# Patient Record
Sex: FEMALE | Race: WHITE | NOT HISPANIC OR LATINO | Employment: OTHER | URBAN - METROPOLITAN AREA
[De-identification: names, ages, dates, MRNs, and addresses within clinical notes are randomized per-mention and may not be internally consistent; named-entity substitution may affect disease eponyms.]

---

## 2022-03-08 ENCOUNTER — OFFICE VISIT (OUTPATIENT)
Dept: PAIN MEDICINE | Facility: CLINIC | Age: 62
End: 2022-03-08

## 2022-03-08 VITALS
SYSTOLIC BLOOD PRESSURE: 150 MMHG | WEIGHT: 120 LBS | DIASTOLIC BLOOD PRESSURE: 84 MMHG | RESPIRATION RATE: 16 BRPM | HEART RATE: 92 BPM | BODY MASS INDEX: 19.29 KG/M2 | HEIGHT: 66 IN | OXYGEN SATURATION: 95 %

## 2022-03-08 DIAGNOSIS — M54.16 LUMBAR RADICULITIS: ICD-10-CM

## 2022-03-08 DIAGNOSIS — M47.817 LUMBOSACRAL SPONDYLOSIS WITHOUT MYELOPATHY: ICD-10-CM

## 2022-03-08 DIAGNOSIS — G89.4 CHRONIC PAIN SYNDROME: Primary | ICD-10-CM

## 2022-03-08 PROCEDURE — 99204 OFFICE O/P NEW MOD 45 MIN: CPT | Performed by: ANESTHESIOLOGY

## 2022-03-08 RX ORDER — ALBUTEROL SULFATE 90 UG/1
AEROSOL, METERED RESPIRATORY (INHALATION)
COMMUNITY
Start: 2022-03-01 | End: 2022-03-08 | Stop reason: ALTCHOICE

## 2022-03-08 RX ORDER — CARIPRAZINE 3 MG/1
CAPSULE, GELATIN COATED ORAL
COMMUNITY
Start: 2022-03-01 | End: 2022-03-08 | Stop reason: ALTCHOICE

## 2022-03-08 RX ORDER — ERGOCALCIFEROL 1.25 MG/1
CAPSULE ORAL
COMMUNITY
Start: 2022-01-12 | End: 2022-03-08 | Stop reason: ALTCHOICE

## 2022-03-08 RX ORDER — LISINOPRIL 40 MG/1
TABLET ORAL
COMMUNITY
Start: 2013-10-07

## 2022-03-08 RX ORDER — OMEPRAZOLE 40 MG/1
CAPSULE, DELAYED RELEASE ORAL
COMMUNITY
Start: 2022-03-01

## 2022-03-08 RX ORDER — ATOMOXETINE 60 MG/1
CAPSULE ORAL
COMMUNITY
Start: 2022-03-01

## 2022-03-08 RX ORDER — AMLODIPINE BESYLATE 5 MG/1
TABLET ORAL
COMMUNITY
Start: 2022-03-01

## 2022-03-08 RX ORDER — ESTRADIOL 1 MG/1
TABLET ORAL
COMMUNITY
Start: 2022-03-01

## 2022-03-08 RX ORDER — ROPINIROLE 1 MG/1
TABLET, FILM COATED ORAL
COMMUNITY
Start: 2022-03-01

## 2022-03-08 RX ORDER — FLUTICASONE PROPIONATE 50 MCG
SPRAY, SUSPENSION (ML) NASAL
COMMUNITY
Start: 2022-03-01

## 2022-03-08 RX ORDER — LISINOPRIL 40 MG/1
TABLET ORAL
COMMUNITY
Start: 2022-03-01 | End: 2022-03-08 | Stop reason: ALTCHOICE

## 2022-03-08 RX ORDER — FOLIC ACID 1 MG/1
TABLET ORAL EVERY 24 HOURS
COMMUNITY
Start: 2021-11-16

## 2022-03-08 RX ORDER — FERROUS SULFATE 325(65) MG
TABLET ORAL
COMMUNITY
Start: 2022-03-01

## 2022-03-08 RX ORDER — FERROUS SULFATE 325(65) MG
TABLET ORAL EVERY 12 HOURS SCHEDULED
COMMUNITY

## 2022-03-08 RX ORDER — ESCITALOPRAM OXALATE 20 MG/1
TABLET ORAL
COMMUNITY
Start: 2022-03-01

## 2022-03-08 RX ORDER — MONTELUKAST SODIUM 10 MG/1
TABLET ORAL
COMMUNITY
Start: 2013-10-07

## 2022-03-08 RX ORDER — MULTIPLE VITAMINS W/ MINERALS TAB 9MG-400MCG
TAB ORAL EVERY 24 HOURS
COMMUNITY
Start: 2021-11-16

## 2022-03-08 RX ORDER — CELECOXIB 200 MG/1
CAPSULE ORAL
COMMUNITY
Start: 2022-03-01

## 2022-03-08 RX ORDER — IPRATROPIUM/ALBUTEROL SULFATE 20-100 MCG
MIST INHALER (GRAM) INHALATION
COMMUNITY
Start: 2022-03-01

## 2022-03-08 RX ORDER — VORTIOXETINE 10 MG/1
TABLET, FILM COATED ORAL
COMMUNITY
Start: 2022-03-01 | End: 2022-03-08 | Stop reason: ALTCHOICE

## 2022-03-08 RX ORDER — CHOLECALCIFEROL (VITAMIN D3) 1250 MCG
CAPSULE ORAL
COMMUNITY
Start: 2021-12-13

## 2022-03-08 RX ORDER — VORTIOXETINE 10 MG/1
TABLET, FILM COATED ORAL EVERY 24 HOURS
COMMUNITY

## 2022-03-08 RX ORDER — ALBUTEROL SULFATE 90 UG/1
AEROSOL, METERED RESPIRATORY (INHALATION)
COMMUNITY

## 2022-03-08 RX ORDER — LAMOTRIGINE 25 MG/1
TABLET ORAL
COMMUNITY
Start: 2022-03-01

## 2022-03-08 NOTE — PROGRESS NOTES
Subjective    CC back pain, joint pain  Cindy Mccabe is a 61 y.o. female with chronic polyarthralgia and back pain here for initial evaluation.  Referred by PCP.  Complains of worsening lumbar back pain radiating to bilateral hips and bilateral lower extremity with burning tingling in the thigh and lower leg constant but worse with activity sweeping house chores walking or standing.  Denies weakness, saddle anesthesia, bladder bowel continence.  Pain significantly impairing ADL.  Tried home exercise program, anti-inflammatories, muscle relaxers without relief.    L-spine MRI 2/2022 mild degenerative disc disease and facet degenerative changes. Mild neuroforaminal narrowing on the left at L2-L3, L3-L4 and mild bilateral neuroforaminal narrowing L4-L5. Remote compression fracture of L1.    Pain Assessment   Location of Pain: Lower Back, R Hip, L Hip,legs, neck pain, joint  Description of Pain: Dull/Aching, Throbbing, Stabbing  Previous Pain Rating :10  Current Pain Rating: 10  Aggravating Factors: Activity  Alleviating Factors: Rest, Medication    PEG Assessment   What number best describes your pain on average in the past week?8  What number best describes how, during the past week, pain has interfered with your enjoyment of life?8  What number best describes how, during the past week, pain has interfered with your general activity? 10     The following portions of the patient's history were reviewed and updated as appropriate: allergies, current medications, past family history, past medical history, past social history, past surgical history and problem list.     has a past medical history of Anxiety, Arthritis, Depression, Hip pain, Hypertension, Knee pain, and Leg pain.   has a past surgical history that includes Hysterectomy.  family history is not on file.  Social History     Tobacco Use   • Smoking status: Current Every Day Smoker     Packs/day: 1.00     Types: Cigarettes   • Smokeless tobacco: Never Used  "  Substance Use Topics   • Alcohol use: Yes     Comment: occasional  one  a  month       Review of Systems   Musculoskeletal: Positive for arthralgias and back pain.        Bilateral leg pain   All other systems reviewed and are negative.      Objective   Physical Exam  Vitals reviewed.   Constitutional:       General: She is not in acute distress.  Pulmonary:      Effort: Pulmonary effort is normal.   Musculoskeletal:      Lumbar back: Tenderness present. Decreased range of motion. Positive right straight leg raise test and positive left straight leg raise test.      Comments: Lumbar loading positive, pain on extension of low back past 5 degrees.  TTP on the lumbar facets noted.         /84   Pulse 92   Resp 16   Ht 167.6 cm (66\")   Wt 54.4 kg (120 lb)   SpO2 95%   BMI 19.37 kg/m²      PHQ 9 on chart  Opioid risk tool low risk    Assessment/Plan   Diagnoses and all orders for this visit:    1. Chronic pain syndrome (Primary)    2. Lumbosacral spondylosis without myelopathy    3. Lumbar radiculitis  -     Epidural Block    Summary  Cindy Mccabe is a 61 y.o. female with chronic polyarthralgia and back pain here for initial evaluation.  Referred by PCP.  Complains of worsening lumbar back pain radiating to bilateral hips and bilateral lower extremity with burning tingling in the thigh and lower leg constant but worse with activity sweeping house chores walking or standing.  Denies weakness, saddle anesthesia, bladder bowel continence.  Pain significantly impairing ADL.  Tried home exercise program, anti-inflammatories, muscle relaxers without relief.    Spine MRI with multiple level degenerative changes foraminal narrowing worse on the right.  Old compression fracture at L1.  Schedule for LESI.  Risks and benefits discussed.  Consider facet blocks/RFA.    RTC for procedure      "

## 2022-03-09 ENCOUNTER — PATIENT ROUNDING (BHMG ONLY) (OUTPATIENT)
Dept: PAIN MEDICINE | Facility: CLINIC | Age: 62
End: 2022-03-09

## 2022-03-09 NOTE — PROGRESS NOTES
March 9, 2022    Hello, may I speak with Cindy Mccabe?    My name is Ana Lilia     I am  with [facility]  [address]  [place] [address].    Before we get started may I verify your date of birth? 1960    I am calling to officially welcome you to our practice and ask about your recent visit. Is this a good time to talk? yes    Tell me about your visit with us. What things went well?  It went well I understand what our plan is.       We're always looking for ways to make our patients' experiences even better. Do you have recommendations on ways we may improve?  no    Overall were you satisfied with your first visit to our practice? yes       I appreciate you taking the time to speak with me today. Is there anything else I can do for you? no      Thank you, and have a great day.

## 2022-05-13 ENCOUNTER — TELEPHONE (OUTPATIENT)
Dept: PAIN MEDICINE | Facility: CLINIC | Age: 62
End: 2022-05-13

## 2022-05-13 NOTE — TELEPHONE ENCOUNTER
Caller: Cindy Mccabe    Relationship to patient: Self    Best call back number: [phone]    Type of visit: EPIDURAL    Requested date: ASAP    Additional notes:PATIENT NEEDS TO BE SCHEDULED AGAIN. PATIENT INSTRUCTED NOT TO LEAVE VOICEMAIL-SHE CANT ACCESS.

## 2022-06-10 ENCOUNTER — APPOINTMENT (OUTPATIENT)
Dept: PAIN MEDICINE | Facility: HOSPITAL | Age: 62
End: 2022-06-10